# Patient Record
Sex: FEMALE
[De-identification: names, ages, dates, MRNs, and addresses within clinical notes are randomized per-mention and may not be internally consistent; named-entity substitution may affect disease eponyms.]

---

## 2024-07-03 ENCOUNTER — ASOB RESULT (OUTPATIENT)
Age: 24
End: 2024-07-03

## 2024-07-03 ENCOUNTER — APPOINTMENT (OUTPATIENT)
Dept: ANTEPARTUM | Facility: CLINIC | Age: 24
End: 2024-07-03
Payer: MEDICAID

## 2024-07-03 PROCEDURE — 76817 TRANSVAGINAL US OBSTETRIC: CPT

## 2024-07-03 PROCEDURE — 76805 OB US >/= 14 WKS SNGL FETUS: CPT

## 2024-08-02 ENCOUNTER — APPOINTMENT (OUTPATIENT)
Dept: ANTEPARTUM | Facility: CLINIC | Age: 24
End: 2024-08-02

## 2024-08-02 ENCOUNTER — ASOB RESULT (OUTPATIENT)
Age: 24
End: 2024-08-02

## 2024-08-02 PROCEDURE — 76816 OB US FOLLOW-UP PER FETUS: CPT

## 2024-10-14 PROBLEM — Z00.00 ENCOUNTER FOR PREVENTIVE HEALTH EXAMINATION: Status: ACTIVE | Noted: 2024-10-14

## 2024-12-03 ENCOUNTER — ASOB RESULT (OUTPATIENT)
Age: 24
End: 2024-12-03

## 2024-12-03 ENCOUNTER — APPOINTMENT (OUTPATIENT)
Dept: ANTEPARTUM | Facility: CLINIC | Age: 24
End: 2024-12-03
Payer: MEDICAID

## 2024-12-03 PROCEDURE — 76820 UMBILICAL ARTERY ECHO: CPT | Mod: 59

## 2024-12-03 PROCEDURE — 76816 OB US FOLLOW-UP PER FETUS: CPT

## 2024-12-03 PROCEDURE — 76819 FETAL BIOPHYS PROFIL W/O NST: CPT

## 2024-12-13 ENCOUNTER — ASOB RESULT (OUTPATIENT)
Age: 24
End: 2024-12-13

## 2024-12-13 ENCOUNTER — APPOINTMENT (OUTPATIENT)
Dept: ANTEPARTUM | Facility: CLINIC | Age: 24
End: 2024-12-13
Payer: MEDICAID

## 2024-12-13 PROCEDURE — 76815 OB US LIMITED FETUS(S): CPT

## 2024-12-13 PROCEDURE — 76820 UMBILICAL ARTERY ECHO: CPT

## 2024-12-13 PROCEDURE — 76818 FETAL BIOPHYS PROFILE W/NST: CPT

## 2024-12-14 ENCOUNTER — INPATIENT (INPATIENT)
Facility: HOSPITAL | Age: 24
LOS: 2 days | Discharge: HOME | DRG: 833 | End: 2024-12-17
Attending: SPECIALIST | Admitting: SPECIALIST
Payer: COMMERCIAL

## 2024-12-14 VITALS
SYSTOLIC BLOOD PRESSURE: 123 MMHG | RESPIRATION RATE: 19 BRPM | TEMPERATURE: 98 F | DIASTOLIC BLOOD PRESSURE: 83 MMHG | OXYGEN SATURATION: 100 % | HEART RATE: 76 BPM

## 2024-12-14 DIAGNOSIS — O26.899 OTHER SPECIFIED PREGNANCY RELATED CONDITIONS, UNSPECIFIED TRIMESTER: ICD-10-CM

## 2024-12-14 NOTE — OB RN TRIAGE NOTE - FALL HARM RISK - UNIVERSAL INTERVENTIONS
Bed in lowest position, wheels locked, appropriate side rails in place/Call bell, personal items and telephone in reach/Instruct patient to call for assistance before getting out of bed or chair/Non-slip footwear when patient is out of bed/[place] to call system/Physically safe environment - no spills, clutter or unnecessary equipment/Purposeful Proactive Rounding/Room/bathroom lighting operational, light cord in reach

## 2024-12-15 LAB
ALBUMIN SERPL ELPH-MCNC: 4.1 G/DL — SIGNIFICANT CHANGE UP (ref 3.3–5)
ALP SERPL-CCNC: 116 U/L — SIGNIFICANT CHANGE UP (ref 40–120)
ALT FLD-CCNC: 8 U/L — LOW (ref 10–45)
ANION GAP SERPL CALC-SCNC: 13 MMOL/L — SIGNIFICANT CHANGE UP (ref 5–17)
AST SERPL-CCNC: 20 U/L — SIGNIFICANT CHANGE UP (ref 10–40)
BASOPHILS # BLD AUTO: 0.02 K/UL — SIGNIFICANT CHANGE UP (ref 0–0.2)
BASOPHILS NFR BLD AUTO: 0.2 % — SIGNIFICANT CHANGE UP (ref 0–2)
BILIRUB SERPL-MCNC: 0.3 MG/DL — SIGNIFICANT CHANGE UP (ref 0.2–1.2)
BLD GP AB SCN SERPL QL: NEGATIVE — SIGNIFICANT CHANGE UP
BUN SERPL-MCNC: 6 MG/DL — LOW (ref 7–23)
CALCIUM SERPL-MCNC: 9.3 MG/DL — SIGNIFICANT CHANGE UP (ref 8.4–10.5)
CHLORIDE SERPL-SCNC: 99 MMOL/L — SIGNIFICANT CHANGE UP (ref 96–108)
CO2 SERPL-SCNC: 21 MMOL/L — LOW (ref 22–31)
CREAT ?TM UR-MCNC: 44 MG/DL — SIGNIFICANT CHANGE UP
CREAT SERPL-MCNC: 0.53 MG/DL — SIGNIFICANT CHANGE UP (ref 0.5–1.3)
EGFR: 134 ML/MIN/1.73M2 — SIGNIFICANT CHANGE UP
EOSINOPHIL # BLD AUTO: 0.05 K/UL — SIGNIFICANT CHANGE UP (ref 0–0.5)
EOSINOPHIL NFR BLD AUTO: 0.6 % — SIGNIFICANT CHANGE UP (ref 0–6)
FIBRINOGEN PPP-MCNC: 427 MG/DL — SIGNIFICANT CHANGE UP (ref 200–445)
GLUCOSE SERPL-MCNC: 95 MG/DL — SIGNIFICANT CHANGE UP (ref 70–99)
HCT VFR BLD CALC: 39.8 % — SIGNIFICANT CHANGE UP (ref 34.5–45)
HGB BLD-MCNC: 12.7 G/DL — SIGNIFICANT CHANGE UP (ref 11.5–15.5)
IMM GRANULOCYTES NFR BLD AUTO: 0.4 % — SIGNIFICANT CHANGE UP (ref 0–0.9)
LDH SERPL L TO P-CCNC: 201 U/L — SIGNIFICANT CHANGE UP (ref 50–242)
LYMPHOCYTES # BLD AUTO: 2.23 K/UL — SIGNIFICANT CHANGE UP (ref 1–3.3)
LYMPHOCYTES # BLD AUTO: 25 % — SIGNIFICANT CHANGE UP (ref 13–44)
MCHC RBC-ENTMCNC: 28.3 PG — SIGNIFICANT CHANGE UP (ref 27–34)
MCHC RBC-ENTMCNC: 31.9 G/DL — LOW (ref 32–36)
MCV RBC AUTO: 88.6 FL — SIGNIFICANT CHANGE UP (ref 80–100)
MONOCYTES # BLD AUTO: 0.55 K/UL — SIGNIFICANT CHANGE UP (ref 0–0.9)
MONOCYTES NFR BLD AUTO: 6.2 % — SIGNIFICANT CHANGE UP (ref 2–14)
NEUTROPHILS # BLD AUTO: 6.04 K/UL — SIGNIFICANT CHANGE UP (ref 1.8–7.4)
NEUTROPHILS NFR BLD AUTO: 67.6 % — SIGNIFICANT CHANGE UP (ref 43–77)
NRBC # BLD: 0 /100 WBCS — SIGNIFICANT CHANGE UP (ref 0–0)
PLATELET # BLD AUTO: 190 K/UL — SIGNIFICANT CHANGE UP (ref 150–400)
POTASSIUM SERPL-MCNC: 3.9 MMOL/L — SIGNIFICANT CHANGE UP (ref 3.5–5.3)
POTASSIUM SERPL-SCNC: 3.9 MMOL/L — SIGNIFICANT CHANGE UP (ref 3.5–5.3)
PROT ?TM UR-MCNC: 6 MG/DL — SIGNIFICANT CHANGE UP (ref 0–12)
PROT SERPL-MCNC: 7.5 G/DL — SIGNIFICANT CHANGE UP (ref 6–8.3)
PROT/CREAT UR-RTO: 0.1 RATIO — SIGNIFICANT CHANGE UP (ref 0–0.2)
RBC # BLD: 4.49 M/UL — SIGNIFICANT CHANGE UP (ref 3.8–5.2)
RBC # FLD: 15.2 % — HIGH (ref 10.3–14.5)
RH IG SCN BLD-IMP: POSITIVE — SIGNIFICANT CHANGE UP
RH IG SCN BLD-IMP: POSITIVE — SIGNIFICANT CHANGE UP
SODIUM SERPL-SCNC: 133 MMOL/L — LOW (ref 135–145)
URATE SERPL-MCNC: 4.5 MG/DL — SIGNIFICANT CHANGE UP (ref 2.5–7)
WBC # BLD: 8.93 K/UL — SIGNIFICANT CHANGE UP (ref 3.8–10.5)
WBC # FLD AUTO: 8.93 K/UL — SIGNIFICANT CHANGE UP (ref 3.8–10.5)

## 2024-12-15 RX ORDER — SODIUM CHLORIDE 9 MG/ML
3 INJECTION, SOLUTION INTRAMUSCULAR; INTRAVENOUS; SUBCUTANEOUS EVERY 8 HOURS
Refills: 0 | Status: DISCONTINUED | OUTPATIENT
Start: 2024-12-15 | End: 2024-12-17

## 2024-12-15 RX ORDER — AMPICILLIN TRIHYDRATE 250 MG/5ML
1 SUSPENSION, RECONSTITUTED, ORAL (ML) ORAL EVERY 4 HOURS
Refills: 0 | Status: DISCONTINUED | OUTPATIENT
Start: 2024-12-15 | End: 2024-12-16

## 2024-12-15 RX ORDER — ACETAMINOPHEN 500MG 500 MG/1
975 TABLET, COATED ORAL
Refills: 0 | Status: DISCONTINUED | OUTPATIENT
Start: 2024-12-15 | End: 2024-12-17

## 2024-12-15 RX ORDER — TETANUS TOXOID, REDUCED DIPHTHERIA TOXOID AND ACELLULAR PERTUSSIS VACCINE, ADSORBED 5; 2.5; 8; 8; 2.5 [IU]/.5ML; [IU]/.5ML; UG/.5ML; UG/.5ML; UG/.5ML
0.5 SUSPENSION INTRAMUSCULAR ONCE
Refills: 0 | Status: DISCONTINUED | OUTPATIENT
Start: 2024-12-15 | End: 2024-12-17

## 2024-12-15 RX ORDER — DEXAMETHASONE 1.5 MG/1
4 TABLET ORAL EVERY 6 HOURS
Refills: 0 | Status: DISCONTINUED | OUTPATIENT
Start: 2024-12-15 | End: 2024-12-17

## 2024-12-15 RX ORDER — ONDANSETRON HYDROCHLORIDE 4 MG/1
4 TABLET, FILM COATED ORAL EVERY 6 HOURS
Refills: 0 | Status: DISCONTINUED | OUTPATIENT
Start: 2024-12-15 | End: 2024-12-17

## 2024-12-15 RX ORDER — DIPHENHYDRAMINE HCL 25 MG
25 CAPSULE ORAL EVERY 6 HOURS
Refills: 0 | Status: DISCONTINUED | OUTPATIENT
Start: 2024-12-15 | End: 2024-12-17

## 2024-12-15 RX ORDER — INFLUENZA VIRUS VACCINE 15; 15; 15; 15 UG/.5ML; UG/.5ML; UG/.5ML; UG/.5ML
0.5 SUSPENSION INTRAMUSCULAR ONCE
Refills: 0 | Status: COMPLETED | OUTPATIENT
Start: 2024-12-15 | End: 2024-12-15

## 2024-12-15 RX ORDER — 0.9 % SODIUM CHLORIDE 0.9 %
500 INTRAVENOUS SOLUTION INTRAVENOUS ONCE
Refills: 0 | Status: COMPLETED | OUTPATIENT
Start: 2024-12-15 | End: 2024-12-15

## 2024-12-15 RX ORDER — KETOROLAC TROMETHAMINE 30 MG/ML
30 INJECTION INTRAMUSCULAR; INTRAVENOUS ONCE
Refills: 0 | Status: DISCONTINUED | OUTPATIENT
Start: 2024-12-15 | End: 2024-12-15

## 2024-12-15 RX ORDER — WITCH HAZEL 50 G/100ML
1 SOLUTION RECTAL EVERY 4 HOURS
Refills: 0 | Status: DISCONTINUED | OUTPATIENT
Start: 2024-12-15 | End: 2024-12-17

## 2024-12-15 RX ORDER — FENTANYL/BUPIVACAINE/NS/PF 2-625MCG/1
250 PLASTIC BAG, INJECTION (ML) INJECTION
Refills: 0 | Status: DISCONTINUED | OUTPATIENT
Start: 2024-12-15 | End: 2024-12-15

## 2024-12-15 RX ORDER — .BETA.-CAROTENE, SODIUM ACETATE, ASCORBIC ACID, CHOLECALCIFEROL, .ALPHA.-TOCOPHEROL ACETATE, DL-, THIAMINE MONONITRATE, RIBOFLAVIN, NIACINAMIDE, PYRIDOXINE HYDROCHLORIDE, FOLIC ACID, CYANOCOBALAMIN, CALCIUM CARBONATE, FERROUS FUMARATE, ZINC OXIDE AND CUPRIC OXIDE 2000; 2000; 120; 400; 22; 1.84; 3; 20; 10; 1; 12; 200; 27; 25; 2 [IU]/1; [IU]/1; MG/1; [IU]/1; MG/1; MG/1; MG/1; MG/1; MG/1; MG/1; UG/1; MG/1; MG/1; MG/1; MG/1
1 TABLET ORAL DAILY
Refills: 0 | Status: DISCONTINUED | OUTPATIENT
Start: 2024-12-15 | End: 2024-12-17

## 2024-12-15 RX ORDER — OXYCODONE HYDROCHLORIDE 30 MG/1
5 TABLET ORAL
Refills: 0 | Status: DISCONTINUED | OUTPATIENT
Start: 2024-12-15 | End: 2024-12-17

## 2024-12-15 RX ORDER — IBUPROFEN 200 MG
600 TABLET ORAL EVERY 6 HOURS
Refills: 0 | Status: COMPLETED | OUTPATIENT
Start: 2024-12-15 | End: 2025-11-13

## 2024-12-15 RX ORDER — 0.9 % SODIUM CHLORIDE 0.9 %
1000 INTRAVENOUS SOLUTION INTRAVENOUS
Refills: 0 | Status: DISCONTINUED | OUTPATIENT
Start: 2024-12-15 | End: 2024-12-15

## 2024-12-15 RX ORDER — AMPICILLIN TRIHYDRATE 250 MG/5ML
SUSPENSION, RECONSTITUTED, ORAL (ML) ORAL
Refills: 0 | Status: DISCONTINUED | OUTPATIENT
Start: 2024-12-15 | End: 2024-12-16

## 2024-12-15 RX ORDER — HYDROCORTISONE BUTYRATE 0.1 %
1 CREAM (GRAM) TOPICAL EVERY 6 HOURS
Refills: 0 | Status: DISCONTINUED | OUTPATIENT
Start: 2024-12-15 | End: 2024-12-17

## 2024-12-15 RX ORDER — CHLORHEXIDINE GLUCONATE 1.2 MG/ML
1 RINSE ORAL DAILY
Refills: 0 | Status: DISCONTINUED | OUTPATIENT
Start: 2024-12-15 | End: 2024-12-15

## 2024-12-15 RX ORDER — DIBUCAINE 1 %
1 OINTMENT (GRAM) TOPICAL EVERY 6 HOURS
Refills: 0 | Status: DISCONTINUED | OUTPATIENT
Start: 2024-12-15 | End: 2024-12-17

## 2024-12-15 RX ORDER — PRAMOXINE HYDROCHLORIDE 1 MG/ML
1 LOTION TOPICAL EVERY 4 HOURS
Refills: 0 | Status: DISCONTINUED | OUTPATIENT
Start: 2024-12-15 | End: 2024-12-17

## 2024-12-15 RX ORDER — SIMETHICONE 125 MG
80 CAPSULE ORAL EVERY 4 HOURS
Refills: 0 | Status: DISCONTINUED | OUTPATIENT
Start: 2024-12-15 | End: 2024-12-17

## 2024-12-15 RX ORDER — TRISODIUM CITRATE DIHYDRATE AND CITRIC ACID MONOHYDRATE 500; 334 MG/5ML; MG/5ML
15 SOLUTION ORAL EVERY 6 HOURS
Refills: 0 | Status: DISCONTINUED | OUTPATIENT
Start: 2024-12-15 | End: 2024-12-15

## 2024-12-15 RX ORDER — BENZOCAINE 10 %
1 OINTMENT (GRAM) TOPICAL EVERY 6 HOURS
Refills: 0 | Status: DISCONTINUED | OUTPATIENT
Start: 2024-12-15 | End: 2024-12-17

## 2024-12-15 RX ORDER — LANOLIN 72 %
1 OINTMENT (GRAM) TOPICAL EVERY 6 HOURS
Refills: 0 | Status: DISCONTINUED | OUTPATIENT
Start: 2024-12-15 | End: 2024-12-17

## 2024-12-15 RX ORDER — AMPICILLIN TRIHYDRATE 250 MG/5ML
2 SUSPENSION, RECONSTITUTED, ORAL (ML) ORAL ONCE
Refills: 0 | Status: COMPLETED | OUTPATIENT
Start: 2024-12-15 | End: 2024-12-15

## 2024-12-15 RX ORDER — OXYCODONE HYDROCHLORIDE 30 MG/1
5 TABLET ORAL ONCE
Refills: 0 | Status: DISCONTINUED | OUTPATIENT
Start: 2024-12-15 | End: 2024-12-17

## 2024-12-15 RX ORDER — NALOXONE HCL 0.4 MG/ML
0.1 AMPUL (ML) INJECTION
Refills: 0 | Status: DISCONTINUED | OUTPATIENT
Start: 2024-12-15 | End: 2024-12-17

## 2024-12-15 RX ADMIN — ONDANSETRON HYDROCHLORIDE 4 MILLIGRAM(S): 4 TABLET, FILM COATED ORAL at 16:35

## 2024-12-15 RX ADMIN — Medication 208 GRAM(S): at 06:21

## 2024-12-15 RX ADMIN — ONDANSETRON HYDROCHLORIDE 4 MILLIGRAM(S): 4 TABLET, FILM COATED ORAL at 16:20

## 2024-12-15 RX ADMIN — Medication 167 MILLIUNIT(S)/MIN: at 20:30

## 2024-12-15 RX ADMIN — Medication 208 GRAM(S): at 18:34

## 2024-12-15 RX ADMIN — Medication 1000 MILLILITER(S): at 11:15

## 2024-12-15 RX ADMIN — Medication 2 MILLIUNIT(S)/MIN: at 08:24

## 2024-12-15 RX ADMIN — Medication 208 GRAM(S): at 14:24

## 2024-12-15 RX ADMIN — KETOROLAC TROMETHAMINE 30 MILLIGRAM(S): 30 INJECTION INTRAMUSCULAR; INTRAVENOUS at 22:40

## 2024-12-15 RX ADMIN — Medication 216 GRAM(S): at 02:15

## 2024-12-15 RX ADMIN — Medication 125 MILLILITER(S): at 01:11

## 2024-12-15 RX ADMIN — Medication 208 GRAM(S): at 10:22

## 2024-12-15 NOTE — OB RN DELIVERY SUMMARY - NS_SEPSISRSKCALC_OBGYN_ALL_OB_FT
EOS calculated successfully. EOS Risk Factor: 0.5/1000 live births ([facility] national incidence); GA=40w3d; Temp=98.6; ROM=7.9; GBS='Positive'; Antibiotics='Broad spectrum antibiotics > 4 hrs prior to birth'

## 2024-12-15 NOTE — OB RN PATIENT PROFILE - NS_BABIESUTERO_OBGYN_ALL_OB_NU
"Dennys Bee is a 54 year old man who is being evaluated via a billable telephone visit.      The patient has been notified of following:     \"This telephone visit will be conducted via a call between you and your physician/provider. We have found that certain health care needs can be provided without the need for a physical exam.  This service lets us provide the care you need with a short phone conversation.  If a prescription is necessary we can send it directly to your pharmacy.  If lab work is needed we can place an order for that and you can then stop by our lab to have the test done at a later time.    Telephone visits are billed at different rates depending on your insurance coverage. During this emergency period, for some insurers they may be billed the same as an in-person visit.  Please reach out to your insurance provider with any questions.    If during the course of the call the physician/provider feels a telephone visit is not appropriate, you will not be charged for this service.\"    Patient has given verbal consent for Telephone visit?  Yes    What phone number would you like to be contacted at? [phone]    How would you like to obtain your AVS? Darío    Phone call duration: 24 minutes (5:52 - 6:16)    Division of Infectious Diseases and International Medicine  Department of Medicine        [facility] OUTPATIENT TELEPHONE VISIT NOTE [address] [address]  [address] [place] [address]  Office: [phone]  Fax:  [phone]     HISTORY OF PRESENT ILLNESS:    Mr. Bee is a pleasant 54 year old gentleman with asymptomatic HIV infection who was scheduled for an in-person semi-annual follow-up visit in [facility] but instead because of Covid-19 is residing at his parents' home in [place] at present and so instead of traveling to the [facility] opted for a Virtual Telephone visit today.  He is appreciative of the option.  He remains " on continued antiretroviral therapy with Combivir one tablet PO BID plus nevirapine 200 mg PO BID (since 5/03), reports that he has not missed a dose in a long time, and experiences no side effects or difficulties.  Because he tolerates it well, he has been to switch to an alternative once daily regimen, but we again discussed the pros and cons of that today and he is more interested in making a switch now.  Despite Covid-19 and a history of depression, as well as that his father had a long hospitalization this winter with a diagnosis of cancer (although is doing fairly well now), he reports that his mood has been stable and he is coping adequately.  He remains on chronic Serzone 100 mg TID) and follows with his longterm psychiatrist, Dr. Johnston (at the [facility] in [place]).  He also remains on a decreased dose of fenofibrate 108 mg daily (plus ongoing pravastatin 10 mg PO daily) as well as amlodipine 10 mg daily (increased 5/17/17, started 5/20/15) plus enalapril (Vasotec) 20 mg PO BID (started 11/29/11 and dose increased 1/27/15).  His blood pressures when checked infrequently remain good, he says.  He is not performing aerobic exercise very often.   Additionally, he remains on Synthroid 200 mcg / day (since 3/14) for chronic hypothyroidism and on topical testosterone, Axiron, one 30 mg pump daily (often only taking it every other day) for hypotestosteronism / erectile dysfunction, without side effects.  We have renewed all his non-psychiatric medications today (with an antiretroviral switch).  He reports feeling overall healthy of late without recent acute complaints including no fever, chills, night sweats, anorexia, unusual fatigue, EENT symptoms, cough, dyspnea, chest pain, nausea, abdominal pain, diarrhea, rash, myalgias / arthralgias (despite the statin), genitourinary symptoms (beyond chronic stable nocturnal frequency and ED), headache, or other neurological symptoms.    PAST MEDICAL  HISTORY:    1. HIV, diagnosed 7/93, previously treated with AZT, 3TC and Crixivan. He was then on AZT plus efavirenz, but in 5/03 began Combivir/Sustiva and has remained virologically suppressed on this regimen since.  2. Stable chronic stage 1 kidney disease:  Nephrology Clinic evaluation (including negative MRI scan) unremarkable in 2009.  3. Hyperlipidemia previously requiring multiple medications, more hypertriglyceridemia than hypercholesterolemia -- now on fenofibrate plus Pravachol which was added 4/18/18.   4. Hyperthyroidism on levothyroxine.   5. History of difficult-to-treat anxiety and depression -- followed for a number of years by psychiatrist Dr. Abebe Johnston in [place].   6. Hypertension treated with Vasotec.   7. Acute appendicitis with rupture in the summer of 2007.  8. Cholecystectomy in the summer of 2007.   9. History of abnormal anal Pap smear with AIN-2 and AIN-3, most recently evaluated by colorectal surgery in February of 2009 with a normal biopsy.  Followed there annually.   10. 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  Previously received Sculptra.   11. History of a low testosterone level, most recently checked in 1/09 with a normal free testosterone of 14.0 and a total testosterone in the normal range of 482, managed somewhat unorthodoxly by a now-retired Urologist until 7/13.  Since then, has continued taking biweekly home exogenous testosterone IM injections and has tapered them only marginally from ~ weekly to ~ every other week (or a bit less frequent).  Total testosterone level on 5/14/14 was 2926.  12. Syphilis diagnosed 5/08, peak titer of 1:256, completed a treatment course of IM penicillin times 3, last dose in 6/08.  13. Prior rectal warts, treated with Aldara cream.  14. Infected lower left cracked mandibular molar tooth, repaired 6/10.  15. Unintentional overdose of alcohol, hydrocodone, and  clonazepam for which hospitalized in [place] in 10/12.  16. Mild intermittent controlled extensor limb psoriasis.  17. Sleep apnea, diagnosed elsewhere.    Past Surgical History:   Procedure Laterality Date     APPENDECTOMY OPEN  Summer 2007.    For acute appendicitis with rupture.     CHOLECYSTECTOMY  2007.     ALLERGIES:  PCN and iodine    CURRENT MEDICATIONS:  Current Outpatient Medications   Medication Sig Dispense Refill     amLODIPine (NORVASC) 10 MG tablet Take 1 tablet (10 mg) by mouth daily 90 tablet 3     aspirin 81 MG tablet Take 1 tablet by mouth daily.       bictegravir-emtricitabine-tenofovir (BIKTARVY) -25 MG per tablet Take 1 tablet by mouth daily 90 tablet 3     buPROPion (WELLBUTRIN SR) 150 MG 12 hr tablet Take 1 tablet (150 mg) by mouth 3 times daily 90 tablet 0     enalapril (VASOTEC) 20 MG tablet Take 1 tablet (20 mg) by mouth 2 times daily 180 tablet 3     fenofibrate (LOFIBRA) 54 MG tablet TAKE TWO TABLETS BY MOUTH ONCE DAILY 180 tablet 3     lamoTRIgine (LAMICTAL) 200 MG tablet        levothyroxine (SYNTHROID/LEVOTHROID) 100 MCG tablet TAKE TWO TABLETS BY MOUTH EVERY  tablet 3     multivitamin, therapeutic with minerals (MULTI-VITAMIN) TABS Take 1 tablet by mouth daily       nefazodone (SERZONE) 100 MG tablet 100 mg 4 times daily        pravastatin (PRAVACHOL) 10 MG tablet Take 1 tablet (10 mg) by mouth daily 90 tablet 3     testosterone (AXIRON) 30 MG/ACT topical solution Place 1 pump (30 mg) onto the skin daily 90 mL 5     FAMILY HISTORY:  Family History   Problem Relation Age of Onset     Depression Father      Hyperlipidemia Father      Depression Other         Multiple family members.     Heart Disease Maternal Grandmother      Myocardial Infarction Maternal Grandfather         Sudden MI / death at age 51.     Hyperlipidemia Maternal Grandfather      Obesity Paternal Grandmother      Hyperlipidemia Other         Multiple family members (Parents, brother, sister, PGM)      SOCIAL HISTORY:  Lives alone in basement apartment in [place]ut spends much of his time at his parents home in [place] in the town [place] in and where all his siblings still live.  Unemployed, on disability based on his depression diagnosis.  Sexually active, no single steady partner, mostly with other HIV seropositive men, but always discloses his status when with HIV seronegative partners and uses protection.  In the past, has visited friends in [place] for an extended stay.  Tobacco:  Long-standing, up to 2.5 ppd in the past.  Presently 1 ppd (but smokes only half of each cigarette).  Has quit for up to three weeks several times in the past; not interested in further attempts at reduction at present.  Has a treadmill in the basement of his parents home that he can use for indoor exercise during cold weather, although he does so infrequently.    REVIEW OF SYSTEMS:  As per HPI.  Complete ROS is otherwise negative.    PHYSICAL EXAMINATION:  Reported vitals:  Respirations sound normal.  There were no other vitals taken for this telephone visit.   Most aspects of the Physical Exam were unobtainable in the context of this telephone visit, but the following was ascertained:  GENERAL:  Pleasant, conversant, calm, comfortable-sounding.  ENT:  No evident hearing deficit.  Normal tone of voice and volume.  RESP: No cough, no audible wheezing.  Able to talk in full sentences.  NEURO:  Alert, oriented x 3, memory / cognition sound normal.  PSYCH: Normal affect, coherent speech, able to articulate logical thoughts and reason, no tangential thoughts, no hallucinations.    LABORATORY STUDIES (Reviewed with the patient during his appointment today):      Cholesterol 06/12/2020 184  <200 mg/dL Final     Triglycerides 06/12/2020 216* <150 mg/dL Final    Comment: Borderline high:  150-199 mg/dl  High:             200-499 mg/dl  Very high:       >499 mg/dl       HDL Cholesterol 06/12/2020 51  >39 mg/dL  Final     LDL Cholesterol Calculated 06/12/2020 90  <100 mg/dL Final    Desirable:       <100 mg/dl     Non HDL Cholesterol 06/12/2020 133* <130 mg/dL Final    Comment: Above Desirable:  130-159 mg/dl  Borderline high:  160-189 mg/dl  High:             190-219 mg/dl  Very high:       >219 mg/dl       Treponema Antibodies 06/12/2020 Reactive* NR^Nonreactive Final    Comment: The Anti-Treponema Antibody screening tends to remain positive for life,   therefore it does not distinguish between active and past syphilis infections.   All positive and equivocal results will automatically reflex to a   non-specific Rapid Plasma Reagin (RPR) test.  If the Treponema Antibody is positive, and the RPR is positive, this is   presumptive evidence of current infection, inadequately treated infection,   persistent infection or reinfection.  If the Anti-Treponema Antibody is positive and the RPR is negative, then a   second Treponema specific test (TP-PA) will be performed to determine whether   the antibody test is falsely positive or is detecting early infection.  If the   latter is suspected, repeat testing in approximately two weeks is   recommended.  Methodology Change: Test performed on the [employer] XL by Treponema   pallidum Total Antibodies Assay as of 3.17.2020.       CK Total 06/12/2020 210  30 - 300 U/L Final     Lipase 06/12/2020 91  73 - 393 U/L Final     Testosterone Total 06/12/2020 409  240 - 950 ng/dL Final    Comment: This test was developed and its performance characteristics determined by the   [facility].   It has not been cleared or approved by the FDA. The laboratory is regulated   under CLIA as qualified to perform high-complexity testing. This test is used   for clinical purposes. It should not be regarded as investigational or for   research.       IFC Specimen 06/12/2020 Blood   Final     CD3 Mature T 06/12/2020 89* 49 - 84 % Final      CD4 [place] T 06/12/2020 36  28 - 63 % Final     CD8 Suppressor T 06/12/2020 58* 10 - 40 % Final     CD4:CD8 Ratio 06/12/2020 0.62* 1.40 - 2.60 Final     Absolute CD3 06/12/2020 2,911  603 - 2,990 cells/uL Final     Absolute CD4 06/12/2020 1,169  441 - 2,156 cells/uL Final     Absolute CD8 06/12/2020 1,884* 125 - 1,312 cells/uL Final     HIV-1 RNA Quant Result 06/12/2020 HIV-1 RNA Not Detected  HIVND^HIV-1 RNA Not Detected [Copies]/mL Final    Comment: The BEV AmpliPrep/BEV TaqMan HIV-1 test is an FDA-approved in vitro   nucleic acid amplification test for the quantitation of HIV-1 RNA in human   plasma (EDTA plasma) using the BEV AmpliPrep instrument for automated viral   nucleic acid extraction and the BEV TaqMan Analyzer or [employer] TaqMan for   automated Real Time PCR amplification and detection of the viral nucleic acid   target.  Titer results are reported in copies/ml. This assay is intended for use in   conjunction with clinical presentation and other laboratory markers of disease   prognosis and for use as an aid in assessing viral response to antiretroviral   treatment as measured by changes in plasma HIV-1 RNA levels. This test should   not be used as a donor screening test to confirm the presence of HIV-1   infection.       HIV RNA QT Log 06/12/2020 Not Calculated  <1.3 [Log_copies]/mL Final     WBC 06/12/2020 7.1  4.0 - 11.0 10e9/L Final     RBC Count 06/12/2020 3.57* 4.4 - 5.9 10e12/L Final     Hemoglobin 06/12/2020 13.3  13.3 - 17.7 g/dL Final     Hematocrit 06/12/2020 37.4* 40.0 - 53.0 % Final     MCV 06/12/2020 105* 78 - 100 fl Final     MCH 06/12/2020 37.3* 26.5 - 33.0 pg Final     MCHC 06/12/2020 35.6  31.5 - 36.5 g/dL Final     RDW 06/12/2020 12.9  10.0 - 15.0 % Final     Platelet Count 06/12/2020 322  150 - 450 10e9/L Final     Diff Method 06/12/2020 Automated Method   Final     % Neutrophils 06/12/2020 50.4  % Final     % Lymphocytes 06/12/2020 40.4  % Final     % Monocytes 06/12/2020  5.6  % Final     % Eosinophils 06/12/2020 2.5  % Final     % Basophils 06/12/2020 0.8  % Final     % Immature Granulocytes 06/12/2020 0.3  % Final     Nucleated RBCs 06/12/2020 0  0 /100 Final     Absolute Neutrophil 06/12/2020 3.6  1.6 - 8.3 10e9/L Final     Absolute Lymphocytes 06/12/2020 2.9  0.8 - 5.3 10e9/L Final     Absolute Monocytes 06/12/2020 0.4  0.0 - 1.3 10e9/L Final     Absolute Eosinophils 06/12/2020 0.2  0.0 - 0.7 10e9/L Final     Absolute Basophils 06/12/2020 0.1  0.0 - 0.2 10e9/L Final     Abs Immature Granulocytes 06/12/2020 0.0  0 - 0.4 10e9/L Final     Absolute Nucleated RBC 06/12/2020 0.0   Final     Sodium 06/12/2020 139  133 - 144 mmol/L Final     Potassium 06/12/2020 3.8  3.4 - 5.3 mmol/L Final     Chloride 06/12/2020 108  94 - 109 mmol/L Final     Carbon Dioxide 06/12/2020 25  20 - 32 mmol/L Final     Anion Gap 06/12/2020 6  3 - 14 mmol/L Final     Glucose 06/12/2020 98  70 - 99 mg/dL Final     Urea Nitrogen 06/12/2020 13  7 - 30 mg/dL Final     Creatinine 06/12/2020 1.00  0.66 - 1.25 mg/dL Final     GFR Estimate 06/12/2020 86  >60 mL/min/[1.73_m2] Final    Comment: Non  GFR Calc  Starting 12/18/2018, serum creatinine based estimated GFR (eGFR) will be   calculated using the Chronic Kidney Disease Epidemiology Collaboration   (CKD-EPI) equation.       GFR Estimate If Black 06/12/2020 >90  >60 mL/min/[1.73_m2] Final    Comment:  GFR Calc  Starting 12/18/2018, serum creatinine based estimated GFR (eGFR) will be   calculated using the Chronic Kidney Disease Epidemiology Collaboration   (CKD-EPI) equation.       Calcium 06/12/2020 9.1  8.5 - 10.1 mg/dL Final     Bilirubin Total 06/12/2020 0.4  0.2 - 1.3 mg/dL Final     Albumin 06/12/2020 4.2  3.4 - 5.0 g/dL Final     Protein Total 06/12/2020 7.4  6.8 - 8.8 g/dL Final     Alkaline Phosphatase 06/12/2020 54  40 - 150 U/L Final     ALT 06/12/2020 26  0 - 70 U/L Final     AST 06/12/2020 13  0 - 45 U/L Final     Rapid  Plasma Reagin 06/12/2020 Nonreactive  NR^Nonreactive Final    Comment: This test should not be used as a primary diagnostic approach for syphilis,   and a serum specimen should be submitted for a treponemal-specific antibody   test.  Biological false-positive reactions with cardiolipin-type antigens have been   reported in disease such as infectious mononucleosis, leprosy, malaria, lupus   erythematosus, vaccinia, and viral pneumonia.  Pregnancy, autoimmune diseases,   and narcotic additions may give false-positives. Pinta, yaws, bejel, and   other treponemal diseases may also produce false-positive results with this   test.  Specimen sent to [facility]) for   confirmation.       T Pallidum by FLORES conf 06/12/2020 Reactive* Non Reactive Final    Comment: (Note)  Performed by [employer],  [address] [place] [id number] [phone]  [email or web address], Terrence Naqvi MD, Lab. Director       ASSESSMENT/PLAN:    1. Stable HIV infection:  He has been on antiretroviral therapy of Combivir / nevirapine long-term with tight dosing adherence, a normal absolute CD4+ cell count and an undetectable HIV plasma viral load, but wonders if switching off zidovudine would be safer for his heart and lipids going forward, and says he would appreciate being on a one-pill-once-daily regimen.  He is interested in trying Biktarvy instead.  We discussed the pros and cons of this switch, including dosing schedule and possible side effects, as well as the option (since he remains in good virological control) of switching back to nevirapine / Combivir in the future if for some reason he dislikes Biktarvy.  He will switch to Biktarvy one tablet daily with his next prescription refill.  Assuming that goes well, he will return to clinic for follow-up in six months (with pre-labs before that visit, ordered today), or as needed before then.  2. Controlled essential hypertension:  On ongoing amlodipine 10  mg PO daily (increased 5/17/17, started 5/20/15) and enalapril 20 mg PO BID (started 11/29/11 and dose increased 1/27/15) his blood pressure has been well-controlled in the past, both by office and (per his indirect report) home measurements.  I asked him to try to obtain three BP readings within the next month or so and send those [facility].  As usual, I again encouraged frequent aerobic exercise.  3. Controlled hyperlipidemia:  With additional of pravastatin 10 mg PO daily in early 2018 (which he tolerates well) and perhaps due to other factors (different psychiatric medications?, decreased testosterone dose), his most recent triglycerides on 12/13/19 were improved at 96 even though his fenofibrate dose was reduced from 162 mg daily to 108 mg daily at his most recent past 6/18/19 clinic appointment.  His total cholesterol remained improved at 184 on 6/12/20 (with an LDL of 90 and HDL of 51).  He will stay on the present fenofibrate dose of 108 mg PO daily for now, but consider decreasing it further to only 54 mg (one tablet) daily a month or two before his next blood draw, if he wishes, with a recheck of his lipids profile at that draw.  4. Testosterone replacement therapy / previous erectile dysfunction:  His most recent total testosterone level on 6/12/20 was normal and appropriate at 409 on Axiron  transcutaneous testosterone topical replacement therapy, initially at two (30 mg) pumps (= 60 mg) daily (since late 1/15), but now in the past year at one pump daily since mid-2019.  5. Appropriately-treated hypothyroidism:  He feels well and his TSH was again normal on 12/13/19 at 1.79, so he continues taking the same levothyroxine dose of 200 mcg / day (which was lowered in 3/14).  6. BPH / nocturnal urinary frequency:  He had palpable prostatic enlargement on 10/18/17 digital rectal exam with a normal PSA screen.  His nocturia is unchanged.  He is not been interested in trying Flomax.  7. Stable, unchanged,  old cheeks lipoatrophy:  He previously received Sculptra injections to the cheeks and is considering undergoing such injections again.  This is another reason he has decided to switch off zidovudine, although there has not been any evident progression of his lipodystrophy for a number of years.  8. Chronic, intractable depression / anxiety / insomnia / agoraphobia:  He follows with Dr. Johnston, his psychiatrist in the [facility] in [place], who he sees every two months.  At present, he seems to be tolerating winter reasonably well on nefazodone (Serzone) 100 mg PO TID, which he seems to tolerate well.  We previously determined there are no major Serzone drug-drug interaction issues with his current antiretroviral drugs nor with Biktarvy (although the bictegravir AUC may be somewhat mildly increased) and no major concerns with his other medications.  9. Chronic stage 2 renal insufficiency, now resolved:  He has a history of mild chronic kidney insufficiency with past creatinines previously in the 1.2 - 1.5 range since 2005, but now with the three most recent creatinines stable in the 1.00 - 1.07 range in 2019 - 20.  10. History of prior mild mid-winter vitamin D deficiency:  He self-takes OTC vitamin D supplements most white, although whether he needs that is questionable, because his 10/12/18 vitamin D level was adequate at 20.  11. Mild psoriasis, presently in remission:  Well-controlled with topical Clobex prn.  Not discussed today.  12. Health Care Maintenance:  Influenza vaccine given 12/18/19.  Menactra vaccine given 11/18/15.  Tdap was boosted 1/21/15.  Prevnar 13 given 10/16/13; Pneumovax given 1/21/15.  HAV / HBV immunized / seroimmune.  HCV negative 6/1/09.  Negative rectal Pap done by Dr. Sanchez 6/28/10.  Antitreponemal antibody positive since 8/27/09 but repeat RPR was again negative on 6/12/20.  Quantiferon Gold was negative 11/12/15.  A screening hemoglobin A1C (because he has had  several slightly elevated random glucoses over the past several years) was normal at 5.6% on 6/14/19.  Sees a dentist annually.  Sees a sleep apnea specialist for Cpap.  Saw an ophthalmologist in early 2012.  Had a  non-[facility] dermatologist appointment for a complete skin check in early 2019 and was told he had only chronic lentiga without any concerning lesions.  Underwent a routine screening colonoscopy at [facility] in [place], [place], in 1/19 which was normal (without polyps, per the patient) except for diverticulosis  -- encourage to intake more fiber.       1

## 2024-12-15 NOTE — OB PROVIDER H&P - NSLOWPPHRISK_OBGYN_A_OB
No previous uterine incision/Eng Pregnancy/Less than or equal to 4 previous vaginal births/No known bleeding disorder/No history of postpartum hemorrhage/No other PPH risks indicated

## 2024-12-15 NOTE — OB RN DELIVERY SUMMARY - NSSELHIDDEN_OBGYN_ALL_OB_FT
[NS_DeliveryAttending1_OBGYN_ALL_OB_FT:[id number]],[NS_DeliveryAssist1_OBGYN_ALL_OB_FT:[id number]],[NS_DeliveryRN_OBGYN_ALL_OB_FT:[id number]],[NS_CirculateRN2_OBGYN_ALL_OB_FT:[id number]]

## 2024-12-15 NOTE — OB PROVIDER DELIVERY SUMMARY - NSSELHIDDEN_OBGYN_ALL_OB_FT
[NS_DeliveryAttending1_OBGYN_ALL_OB_FT:[id number]],[NS_DeliveryAssist1_OBGYN_ALL_OB_FT:[id number]],[NS_DeliveryRN_OBGYN_ALL_OB_FT:[id number]],[NS_CirculateRN2_OBGYN_ALL_OB_FT:[id number]]

## 2024-12-15 NOTE — PROVIDER CONTACT NOTE (FALL NOTIFICATION) - SITUATION
Getting up to wheelchair for transfer to postpartum, pt loss conscious and fell to ground, emergency alarmed pulled and nursing staff and providers immediately to came to bedside

## 2024-12-15 NOTE — OB RN DELIVERY SUMMARY - NS_RESUSCITPROC_OBGYN_ALL_OB
Patient will be referred to the appropriate level of outpatient treatment and have appointments within 3-5 days of discharge.  No substance abuse issues  Benefits in place   Safe home to return to at time of dc Patient will be referred to the appropriate level of outpatient treatment and have appointments within 3-5 days of discharge.  No substance abuse issues  Benefits in place   Safe home to return to at time of dc Patient will be referred to the appropriate level of outpatient treatment and have appointments within 3-5 days of discharge.  No substance abuse issues  Benefits in place   Safe home to return to at time of dc Nasal Suction/Blow by O2/CPAP/T-Piece Resuscitator/Pulse Oximetry/FIO2

## 2024-12-15 NOTE — CHART NOTE - NSCHARTNOTEFT_GEN_A_CORE
This is a 24y now  s/p uncomplicated . Patient was 3 hours post partum uncomplicated  who vasovagaled upon getting up to wheelchair for transfer to postpartum.  Pt loss consciousness and fell to ground with nursing supporting her body weight. The emergency alarm was pulled. Upon entry to [facility], nursing staff was immediately at bedside with a 1000cc bolus open and running.  At this time, patient was conscious and patient was sitting up in bed with knees to her chest. Patient denies hitting her head. She endorses right ear pain, nausea, and fatigue.     PE:    Vital Signs Last 24 Hrs  T(C): 37.6 (15 Dec 2024 20:45), Max: 37.6 (15 Dec 2024 20:45)  T(F): 99.6 (15 Dec 2024 20:45), Max: 99.6 (15 Dec 2024 20:45)  HR: 113 (15 Dec 2024 22:00) (76 - 113)  BP: 103/62 (15 Dec 2024 22:00) (96/52 - 123/83)  RR: 17 (15 Dec 2024 22:00) (17 - 19)  SpO2: 100% (15 Dec 2024 22:00) (99% - 100%)  FS: 109  UOP: 300    General: AOx3, pale and clammy  Respiratory: no increased work of breathing  Abdomen: soft, nontender, nondistended, no rebound or guarding, uterus fundus firm at midline, fundus 2FB below the umbilicus  Genitourinary: lochia WNL  Extremities: no swelling or calf tenderness        A/P  -Continue to closely monitor patient postpartum  -VS stable, normal lochia, low suspicion for PPH  -Dunlap catheter to remain in situ until tomorrow morning  -Patient instructed to eat, drink and rest      Britni David PA-C

## 2024-12-15 NOTE — OB PROVIDER LABOR PROGRESS NOTE - NS_OBIHIFHRDETAILS_OBGYN_ALL_OB_FT
baseline 125, mod jose, +Accels, -decels; Cat I tracing
baseline 120, mod jose, +accels, -decels; Cat I tracing

## 2024-12-15 NOTE — PROVIDER CONTACT NOTE (FALL NOTIFICATION) - ASSESSMENT
vitals before: 106/64, HR 90, RR 17, O2 98%  vitals after: 96/54, , RR 17, O2 99%  no injuries, complaint of mild ear ache  fundus and lochia within normal limits

## 2024-12-15 NOTE — OB RN DELIVERY SUMMARY - NS_GBSABXNAME_OBGYN_ALL_OB_FT
Pt comes to ED with c/o of fever, coughing and body aches that started last night.  Pt reports taking ibuprofen 45 minutes ago   ampicillin

## 2024-12-15 NOTE — OB PROVIDER LABOR PROGRESS NOTE - NS_SUBJECTIVE/OBJECTIVE_OBGYN_ALL_OB_FT
Day team assuming care. Patient examined by Dr. James, VE 3/80/-3.  EFM reviewed.  Baseline rate is 130 bpm, mod jose, + accels, - decels  Ctx q5 mins  Cat I tracing  Plan to start pitocin per Dr. James
EFM reviewed  Epidural in situ
EFM reviewed. Pit at 4 mu/min.  Baseline rate is 135 bpm, mod jose, + accels, - decels  Ctx q3 mins  Cat I tracing  Continue to monitor labor progression
EFM reviewed. S/p AROM at 1230 by Dr. James for clear fluid.  Baseline rate is 125 bpm, mod jose, - accels, + intermittent late decels  Cat II tracing; overall reassuring with moderate variability  Continue to reposition and resuscitate patient as needed  Will re-examine at 1630 or with increasing pressure
Patient in side-line release; EFM reviewed.  Baseline rate is 125 bpm, mod jose, + accels, + intermittent late decels  Ctx q2-3 mins  Cat II tracing; overall reassuring with moderate variability and + accels  Continue to reposition and resuscitate patient as needed
Patient re-examined for increasing rectal pressure; VE 4/80/-2. Pit at 4 mu/min.  EFM reviewed.  Baseline rate is 125 bpm, mod jose, + accels, - decels  Ctx q2-3 mins  Cat I tracing  Plan for AROM shortly
Patient re-examined; VE 4/80/-2, unchanged.  EFM reviewed.  Baseline rate is 130 bpm, mod jose, + accels, - decels  Ctx q2-3 mins  Cat I tracing  Plan to place [facility] shortly, per Dr. James
Per attending SVE: 2/70/-3  Epidural in situ  EFM reviewed

## 2024-12-15 NOTE — OB PROVIDER DELIVERY SUMMARY - NS_ROMTYPE_OBGYN_ALL_OB
Your Child's Health  8-9 Year-Old Visit      Ge WILLIAM Jordyn  December 11, 2016    Visit Vitals  /74   Ht 4' 1\" (1.245 m)   Wt 21 kg   BMI 13.56 kg/m²     Weight: 46.3 lbs      DEVELOPMENT:  At this age a typical child:  1.  Can read for enjoyment.  2.  Can tell a joke coherently.  3.  Is very concerned about rules and fairness.  4.  Is outspoken when he or she perceives unfairness in parents.  5.  Is able to take care of his or her room and assume responsibility with fewer reminders than at six years of age.  6.  Is learning to tell time (this sometimes takes until age ten).    SAFETY/ACCIDENT PREVENTION:  Accidents are the most common causes of death in  this age range.  Automobile fatalities are the most numerous, followed by deaths due to fire, drowning, falls, and gun accidents.  Continued reminders about seat belts, swimming rules, playing with fire, playing with guns, and other safety measures are necessary.  The seat belt should be across the hips and not across the stomach.  Ge should ride in the back seat.  The center seat is the safest if it has a shoulder harness.      INDIVIDUAL DIFFERENCES:  In this age range, differing abilities in physical coordination, memory, reading, and personality become apparent.  It is particularly important for parents to learn the individual strengths of each child and to praise and encourage them.  Encouragement can take the form of praise, gifts related to the child's interests, trips, memory games at the dinner table, and individual attention from you (yes, they still crave it at this age).    Academic achievement is easily recognized by both parent and school.  Be sure to support and praise the development of non-academic strengths such as artistic, mechanical, social, athletic, or verbal skills.  \"Annika is my finder; she always remembers where things are,\" is more likely to have a good effect than, \"She doesn't get good grades like her brother, but we  AROM like her anyway.\" Your hardest job may be telling the difference between low effort and genuine difficulty when Ge encounters poor grades.    SUN EXPOSURE:  There is now evidence that sun exposure, especially sunburn before the age of ten, increases the risk of skin cancer.  Fair-skinned people always have a higher risk.  Ge should avoid the midday sun, use sun block, and wear protective clothing and sunglasses.  Begin to educate him about tanning booths.  There is no such thing as \"safe\" tanning rays.  Set a good example; avoid burning and crash-tanning.  Follow the guidelines in our Sun Exposure handout.    DIET AND EXERCISE:  Some children may need breakfast to function well at school.  However, you should keep in mind that much of the research about the benefits of having breakfast was done by a breakfast cereal company.  In any case, the meal does not need to be big.    A multivitamin with 600 International Units vitamin D should be given to all children who drink less than 32 oz of milk per day.    Continue reminders about regular tooth brushing.    Television ads and convenience foods encourage a diet high in salt, fat, and calories as well as low in fiber.  Having a variety of alternative snack foods can promote better nutrition.  Pre-cut vegetables, dried fruits, fresh fruits, cheese, and unsalted nuts are examples of good snack foods.  Having these available will not be effective, however, if Ge knows that the cupboards are full of chips and cupcakes.    TELEVISION/VIDEO:  1.  Limit viewing to a maximum of two hours per day.    2.  Excessive TV is associated with obesity, aggressive behavior, and negative moods.  3.  Do not allow TV shows or videos that promote bad attitudes.  Many videos consistently portray women or minorities as victims.   4.  Teach Ge not to eat while watching TV by not doing it yourself.  5.  Encourage other forms of learning and entertainment, such as books,  story-telling, and trips to museums, farms, zoos, etc.    CHORES/ALLOWANCES:  Children at this age will handle chores with encouragement, but they can be very sensitive to fairness.  Chores should be evenly divided among siblings or rotated.  Excusing a child because of special activities, even if they are highly valued by the family (for example, music lessons in a musical family), will breed resentment in the siblings who end up picking up the chores.    An allowance can provide a useful tool for learning about Ge's values and personality as well as providing a lever for discipline.  Make certain that the amount is not so large that Ge does not have to make choices about what to spend it on.    MEDICATION FOR FEVER OR PAIN:   Acetaminophen liquid (e.g., Tylenol or Tempra) may be given every four hours as needed for pain or fever.  Acetaminophen liquid is less concentrated than the infant dropper bottle type. Be sure to check which product CONCENTRATION you are using.      CHILDREN’S Tylenol/Acetaminophen  (160 MG/5 mL)    Child’s Weight:  Dose:  36 - 47 pounds:    240 mg (7.5 mL (1 1/2 Teaspoons))  48 - 59 pounds:    320 mg (10.0 mL (2 Teaspoons))  60 - 71 pounds:    400 mg (12.5 mL (2 1/2 Teaspoons))  72 - 95 pounds:    480 mg (15.0 mL (3 Teaspoons))    CHILDREN’S Tylenol/Acetaminophen MELTAWAYS ( 80 MG tablets)    Child’s Weight:  Dose:  36 - 47 pounds:    240 mg (3 meltaway tablets)  48 - 59 pounds:    320 mg (4 meltaway tablets)  60 - 71 pounds:    400 mg (5 meltaway tablets)  72 - 95 pounds:    480 mg (6 meltaway tablets)    Sebastien (Jr) Tylenol/Acetaminophen MELTAWAYS (160 MG tablets)    Child’s Weight:  Dose:  36 - 47 pounds:    240 mg (1 1/2 meltaway tablets)  48 - 59 pounds:    320 mg (2 meltaway tablets)  60 - 71 pounds:    400 mg (2 1/2 meltaway tablets)  72 - 95 pounds:    480 mg (3 meltaway tablets)    CHILDREN'S Ibuprofen (e.g., Advil or Motrin) may be given every six hours as needed for  pain or fever.    Child’s Weight:  Dose:  48 - 59 pounds:    200 mg (2 Teaspoons of liquid)  60 - 71 pounds:    250 mg (2 1/2Teaspoons of liquid)  72 - 95 pounds:    300 mg (3 Teaspoons of liquid)    NEXT VISIT:  IN 1 - 2 YEARS       Thank you for entrusting your care to [facility].

## 2024-12-15 NOTE — OB PROVIDER H&P - HISTORY OF PRESENT ILLNESS
Patient is a 24 y.o.  @40w3d who presents with ctx for the last 3 days. States this morning the ctx were 6-7 minutes apart and she was checked in the office--VE at that time was 1.5cm. States ctx have gotten closer together over the course of the day, now 3-5 minutes apart States ctx are 7-8/10 in severity, and she wants epidural. Denies LOF, vaginal bleeding. Endorses fetal movement.    Spontaneous pregnancy, uncomplicated. DEBRA 24. NIPT and anatomy scan WNL. Failed GCT however vomited at time of GTT. States she checked FS for a few weeks however was told her values were mostly normal. Denies elevated BPs in the pregnancy. EFW 3200g by Leopolds.  GBS positive.   24: cephalic presentation, anterior placenta, JALEN 15.58, BPP 10/10, GDMA1, hx of placenta previa, resolved    Ob hx: G1- current   Gyn hx: Denies ovarian cysts, fibroids, abn pap, STIs/HSV  PMH: Denies  Meds: PNV  PSH: Denies  Allergies: Denies    Physical Exam  BP  123/83 (12-15-24 @ 01:13)  HR 76  Gen: Well-appearing. NAD.  Resp: Breathing comfortably on RA  Abd: Gravid uterus. Soft, non-tender, non-distended.    SSE:  VE: _    TAUS: _presentation. _placenta. BPP _/8, JALEN _.  TVUS:     FHT: Baseline _, moderate variability, +accels, -decels  [place]: Ctx_    A/P  24yyo G[ ]P[ ] @[ ]w[ ]d presenting [ ].     -     Aurora Alvarado, PGY1  Discussed with  Patient is a 24 y.o.  @40w3d who presents with ctx for the last 3 days. States this morning the ctx were 6-7 minutes apart and she was checked in the office--VE at that time was 1.5cm. States ctx have gotten closer together over the course of the day, now 3-5 minutes apart States ctx are 7-8/10 in severity, and she wants epidural. Denies LOF, vaginal bleeding. Endorses fetal movement. Denies headache, visual changes, chest pain, SOB, RUQ/epgiastric pain.     Spontaneous pregnancy, uncomplicated. DEBRA 24. NIPT and anatomy scan WNL. Failed GCT however vomited at time of GTT. States she checked FS for a few weeks however was told her values were mostly normal. Endorses 140 systolic blood pressure on two separate occasions during third trimester. EFW 3200g by Leopolds.  GBS positive.  BH 24: cephalic presentation, anterior placenta, JALEN 15.58, BPP 10/10, GDMA1, hx of placenta previa, resolved    Ob hx: G1- current   Gyn hx: Denies ovarian cysts, fibroids, abn pap, STIs/HSV  PMH: Denies  Meds: PNV  PSH: Denies  Allergies: Denies    Physical Exam  BP  123/83 (12-15-24 @ 01:13)  HR 76  Gen: Well-appearing. NAD.  Resp: Breathing comfortably on RA  Abd: Gravid uterus. Soft, non-tender, non-distended.    VE: 1.5/50/-3    TAUS: cephalic presentation.    FHT: Baseline 130, moderate variability, +accels, -decels; reactive and reassuring  [place]: Ctx q3-5 minutes    A/P  24 y.o.  @40w3d presenting in early labor, requesting epidural.  -Pt normotensive with no toxic complaints,   -Fetal status reassuring given tracing is reactive and reassuring  -Admit to L&D  -IV fluids  -Full labs sent  -Prenatals reviewed  -Will monitor FS q6 hours for possible GDMA1  -GBS positive, ampicillin ordered  -Risks/benefits/alternatives discussed; consent signed and in chart; all questions answered  -Continuous FHT and TOCO  -Plan for expectant management    Aurora Alvarado, PGY1  Discussed with Dr. James and Dr. Aguilera, PGY3

## 2024-12-15 NOTE — OB NEONATOLOGY/PEDIATRICIAN DELIVERY SUMMARY - BABY A: DATE/TIME OF DELIVERY
Administered B12, 1000 mcg,1cc and Depo Medrol (4)40mg/mL in Right Upper Gluteal    Administered Ketorolac 60mg, 2cc in Left Upper Gluteal    2 patient id's verified, allergies reviewed     15-Dec-2024

## 2024-12-15 NOTE — OB NEONATOLOGY/PEDIATRICIAN DELIVERY SUMMARY - NSPEDSNEONOTESA_OBGYN_ALL_OB_FT
Ped was called to L&D for FT born via  w/ NRFHT. Prior to birth, There was a nuchal cord around the head. OB clamp and cut and delivered the patient. Pt cried at birth. However, pt was having intermittent apneic episodes.  Pt was transferred under the radiant warmer. Where pt was suction, dry and stimulated. Patient was still having apneic episodes. Patient was giving NCPAP x 3min and weaned to room air. Monitor pt for an additional 10min on RA. Apneic episodes was resolved. Patient is clinically stable to go to well baby nursery.

## 2024-12-15 NOTE — OB RN DELIVERY SUMMARY - BABY A: APGAR 5 MIN COLOR, DELIVERY
1. Start with labs (can arrange for flu shot at that time).  I will contact you with results and any further recommendations.  2. Referral to start therapy with our behavioral health clinician (Cele).  She will contact you.  3. Referral for cortisone shots  4. Follow-up with PCP in 1-2 months.  If no improvement in mood at that time, discuss medication changes.   (1) body pink, extremities blue

## 2024-12-15 NOTE — OB PROVIDER DELIVERY SUMMARY - NSPROVIDERDELIVERYNOTE_OBGYN_ALL_OB_FT
25 yo  presented at 40w2d EGA in early labor on 24. Labor was augmented with pitocin. Patient received an epidural for pain control. Amniotomy was performed with clear fluid. She progressed to fully dilated, pushed effectively, and had a  from CHUCKY position with a tight nuchal cordx1 which was reduced and cut. Placenta was delivered spontaneously intact with 3 vessel cord. Cervix was inspected and tears were identified at 3 and 9 o'clock repaired with a 2-0 chromic suture. A second degree laceration and a mediolateral episiotomy were repaired with chromic suture without complications. Excellent hemostasis.  . Patient tolerated procedure well. Mother and infant in stable conditions. Dr. James present throughout procedure. 23 yo  presented at 40w2d EGA in early labor on 24. Labor was augmented with pitocin. Patient received an epidural for pain control. Amniotomy was performed with clear fluid. She progressed to fully dilated, pushed effectively, and had a  from CHUCKY position with a tight nuchal cordx1 which was clamped and cut. Placenta was delivered spontaneously intact with 3 vessel cord. Cervix was inspected and tears were identified at 3 and 9 o'clock, repaired with a 2-0 chromic suture. A mediolateral episiotomy were repaired with chromic suture without complications. Excellent hemostasis.  . Patient tolerated procedure well. Mother and infant in stable conditions.

## 2024-12-15 NOTE — OB PROVIDER DELIVERY SUMMARY - NS_AFTERADMROM_OBGYN_ALL_OB_DT
[Time Spent: ___ minutes] : I have spent [unfilled] minutes of time on the encounter which excludes teaching and separately reported services. 15-Dec-2024 12:32

## 2024-12-16 LAB — T PALLIDUM AB TITR SER: NEGATIVE — SIGNIFICANT CHANGE UP

## 2024-12-16 RX ORDER — IBUPROFEN 200 MG
600 TABLET ORAL EVERY 6 HOURS
Refills: 0 | Status: DISCONTINUED | OUTPATIENT
Start: 2024-12-16 | End: 2024-12-17

## 2024-12-16 RX ADMIN — ACETAMINOPHEN 500MG 975 MILLIGRAM(S): 500 TABLET, COATED ORAL at 09:11

## 2024-12-16 RX ADMIN — ACETAMINOPHEN 500MG 975 MILLIGRAM(S): 500 TABLET, COATED ORAL at 14:46

## 2024-12-16 RX ADMIN — Medication 1 SPRAY(S): at 05:52

## 2024-12-16 RX ADMIN — Medication 600 MILLIGRAM(S): at 05:52

## 2024-12-16 RX ADMIN — SODIUM CHLORIDE 3 MILLILITER(S): 9 INJECTION, SOLUTION INTRAMUSCULAR; INTRAVENOUS; SUBCUTANEOUS at 14:40

## 2024-12-16 RX ADMIN — Medication 600 MILLIGRAM(S): at 11:58

## 2024-12-16 RX ADMIN — Medication 1 APPLICATION(S): at 05:52

## 2024-12-16 RX ADMIN — Medication 600 MILLIGRAM(S): at 18:02

## 2024-12-16 RX ADMIN — .BETA.-CAROTENE, SODIUM ACETATE, ASCORBIC ACID, CHOLECALCIFEROL, .ALPHA.-TOCOPHEROL ACETATE, DL-, THIAMINE MONONITRATE, RIBOFLAVIN, NIACINAMIDE, PYRIDOXINE HYDROCHLORIDE, FOLIC ACID, CYANOCOBALAMIN, CALCIUM CARBONATE, FERROUS FUMARATE, ZINC OXIDE AND CUPRIC OXIDE 1 TABLET(S): 2000; 2000; 120; 400; 22; 1.84; 3; 20; 10; 1; 12; 200; 27; 25; 2 TABLET ORAL at 11:58

## 2024-12-16 NOTE — PROGRESS NOTE ADULT - ASSESSMENT
A/P 24y s/p , PPD#1, stable, meeting postpartum milestones   - Pain: well controlled on tylenol/motrin  - GI: Tolerating regular diet  - : anm catheter in place, to be removed this AM  - DVT prophylaxis: ambulating frequently  - Dispo: PPD 2, unless otherwise specified

## 2024-12-16 NOTE — LACTATION INITIAL EVALUATION - LACTATION INTERVENTIONS
initiate/review safe skin-to-skin/initiate/review hand expression/initiate/review techniques for position and latch/post discharge community resources provided/review techniques to increase milk supply/review techniques to manage sore nipples/engorgement/reviewed components of an effective feeding and at least 8 effective feedings per day required/reviewed importance of monitoring infant diapers, the breastfeeding log, and minimum output each day/reviewed risks of artificial nipples/reviewed benefits and recommendations for rooming in/reviewed feeding on demand/by cue at least 8 times a day/reviewed indications of inadequate milk transfer that would require supplementation

## 2024-12-16 NOTE — LACTATION INITIAL EVALUATION - NS LACT CON REASON FOR REQ
40.3 wk gestation baby, about 13.5 hrs old at this time. Placed the baby STS with the mother while I provided breastfeeding education and explained normal  behaviour and the milk production feedback system. Assisted with positioning in a football  hold and taught latch strategies. Baby was able to latch deeply and is feeding well, rhythmically sucking between short pauses of rest. Mother to continue with STS when possible, room-in, and feed as per cues at least 8-12x/ day. To f/u as needed./inverted/flat nipples/primaparous mom/staff request

## 2024-12-16 NOTE — CHART NOTE - NSCHARTNOTEFT_GEN_A_CORE
Patient reevaluated before moving to postpartum. Patient previously reporting dizziness which is now improved after having dinner and taking a nap. Patient's primary complaint at this moment is feeling sleepy. She denies SOB, chest pain, chest pressure, palpitations. BPs previously with systolics in the 80s, most recently in the high 90s-100.    Physical Exam:  T(C): 37.6 (12-15-24 @ 20:45), Max: 37.6 (12-15-24 @ 20:45)  HR: 96 (12-16-24 @ 00:45) (95 - 115)  BP: 105/62 (12-16-24 @ 00:45) (85/60 - 110/62)  RR: 17 (12-16-24 @ 00:45) (17 - 17)  SpO2: 100% (12-16-24 @ 00:45) (99% - 100%)    GA: NAD, A&O x 3  Pulm: no increased work of breathing  Abd: soft, nontender, nondistended, no rebound or guarding, uterus firm and at midline 2fb below the umbilicus.  : Minimal blood noted with uterine massage. No discoloration or hardening noted along the vaginal walls.    A/P  - Dizziness improved with rest and eating  - VS stable, uterus firm with low suspicion for PPH  - No hematomas noted on exam  - Continue to monitor closely    Discussed with Dr. Tiki Herbert PGY3  Shweta Lira PGY1

## 2024-12-17 ENCOUNTER — TRANSCRIPTION ENCOUNTER (OUTPATIENT)
Age: 24
End: 2024-12-17

## 2024-12-17 ENCOUNTER — APPOINTMENT (OUTPATIENT)
Dept: ANTEPARTUM | Facility: CLINIC | Age: 24
End: 2024-12-17

## 2024-12-17 VITALS
OXYGEN SATURATION: 98 % | RESPIRATION RATE: 18 BRPM | HEART RATE: 88 BPM | SYSTOLIC BLOOD PRESSURE: 104 MMHG | DIASTOLIC BLOOD PRESSURE: 69 MMHG | TEMPERATURE: 98 F

## 2024-12-17 PROCEDURE — 86850 RBC ANTIBODY SCREEN: CPT

## 2024-12-17 PROCEDURE — 85384 FIBRINOGEN ACTIVITY: CPT

## 2024-12-17 PROCEDURE — 82962 GLUCOSE BLOOD TEST: CPT

## 2024-12-17 PROCEDURE — 82570 ASSAY OF URINE CREATININE: CPT

## 2024-12-17 PROCEDURE — 85025 COMPLETE CBC W/AUTO DIFF WBC: CPT

## 2024-12-17 PROCEDURE — 59050 FETAL MONITOR W/REPORT: CPT

## 2024-12-17 PROCEDURE — 86780 TREPONEMA PALLIDUM: CPT

## 2024-12-17 PROCEDURE — 86900 BLOOD TYPING SEROLOGIC ABO: CPT

## 2024-12-17 PROCEDURE — 83615 LACTATE (LD) (LDH) ENZYME: CPT

## 2024-12-17 PROCEDURE — 36415 COLL VENOUS BLD VENIPUNCTURE: CPT

## 2024-12-17 PROCEDURE — 86901 BLOOD TYPING SEROLOGIC RH(D): CPT

## 2024-12-17 PROCEDURE — 80053 COMPREHEN METABOLIC PANEL: CPT

## 2024-12-17 PROCEDURE — 84156 ASSAY OF PROTEIN URINE: CPT

## 2024-12-17 PROCEDURE — 84550 ASSAY OF BLOOD/URIC ACID: CPT

## 2024-12-17 RX ADMIN — WITCH HAZEL 1 APPLICATION(S): 50 SOLUTION RECTAL at 09:38

## 2024-12-17 RX ADMIN — ACETAMINOPHEN 500MG 975 MILLIGRAM(S): 500 TABLET, COATED ORAL at 09:37

## 2024-12-17 RX ADMIN — ACETAMINOPHEN 500MG 975 MILLIGRAM(S): 500 TABLET, COATED ORAL at 15:15

## 2024-12-17 RX ADMIN — Medication 30 MILLILITER(S): at 09:37

## 2024-12-17 RX ADMIN — .BETA.-CAROTENE, SODIUM ACETATE, ASCORBIC ACID, CHOLECALCIFEROL, .ALPHA.-TOCOPHEROL ACETATE, DL-, THIAMINE MONONITRATE, RIBOFLAVIN, NIACINAMIDE, PYRIDOXINE HYDROCHLORIDE, FOLIC ACID, CYANOCOBALAMIN, CALCIUM CARBONATE, FERROUS FUMARATE, ZINC OXIDE AND CUPRIC OXIDE 1 TABLET(S): 2000; 2000; 120; 400; 22; 1.84; 3; 20; 10; 1; 12; 200; 27; 25; 2 TABLET ORAL at 12:31

## 2024-12-17 RX ADMIN — Medication 600 MILLIGRAM(S): at 12:30

## 2024-12-17 NOTE — DISCHARGE NOTE OB - PATIENT PORTAL LINK FT
You can access the FollowMyHealth Patient Portal offered by [facility] by registering at the following website: [email or web address]. By joining [employer]’s FollowMyHealth portal, you will also be able to view your health information using other applications (apps) compatible with our system.

## 2024-12-17 NOTE — DISCHARGE NOTE OB - PLAN OF CARE
# Monitor blood pressure three times a day. Please document the blood pressure values to review with obstetrician at follow-up appointment.   # If your blood pressure is equal to or greater than 160/110 (either one) OR if you experience any of the following symptoms: changes in vision, headache not relieved with Tylenol, severe abdominal pain, vomiting, increased vaginal bleeding, chest pain or shortness of breath, please return to the hospital.  # If your blood pressure is equal to or greater than 150/100 (either one), please call your obstetrician.  # Please schedule an appointment at your physician office in one week. Take Motrin 600mg every 6 hours and/or tylenol 650mg every 6 hours as needed for pain. Call your OB to schedule a follow up appointment in 6 weeks. Nothing per vagina until cleared by your OB - no intercourse, douching, tampons, etc.  Call your OB if you experience severe abdominal pain not improved by oral pain medications, heavy bright red vaginal bleeding saturating more than 1 pad per hour, or fever greater than 100.4F. Consider contraception options to be discussed with your OB.

## 2024-12-17 NOTE — DISCHARGE NOTE OB - FINANCIAL ASSISTANCE
[facility] provides services at a reduced cost to those who are determined to be eligible through [facility]’s financial assistance program. Information regarding [facility]’s financial assistance program can be found by going to [email or web address] or by calling [phone].

## 2024-12-17 NOTE — PROGRESS NOTE ADULT - SUBJECTIVE AND OBJECTIVE BOX
Patient evaluated at bedside this morning, resting comfortable in bed, no acute events overnight.  She reports pain is well controlled with tylenol and motrin.  She denies headache, dizziness, chest pain, palpitations, shortness of breath, nausea, vomiting, fever, chills, heavy vaginal bleeding. She has yet to ambulate without assistance, nam catheter in place.  Tolerating food well, without nausea/vomit.      Physical Exam:  T(C): 36.6 (12-16-24 @ 06:16), Max: 37.6 (12-15-24 @ 20:45)  HR: 81 (12-16-24 @ 06:16) (81 - 115)  BP: 97/65 (12-16-24 @ 06:16) (84/53 - 110/62)  RR: 16 (12-16-24 @ 06:16) (16 - 17)  SpO2: 98% (12-16-24 @ 06:16) (98% - 100%)    GA: NAD, A&O x 3  Pulm: no increased work of breathing  Abd: soft, nontender, nondistended, no rebound or guarding, uterus firm.  Extremities: no calf tenderness                          12.7   8.93  )-----------( 190      ( 15 Dec 2024 00:25 )             39.8     12-15    133[L]  |  99  |  6[L]  ----------------------------<  95  3.9   |  21[L]  |  0.53    Ca    9.3      15 Dec 2024 00:25    TPro  7.5  /  Alb  4.1  /  TBili  0.3  /  DBili  x   /  AST  20  /  ALT  8[L]  /  AlkPhos  116  12-15    acetaminophen     Tablet .. 975 milliGRAM(s) Oral <User Schedule>  benzocaine 20%/menthol 0.5% Spray 1 Spray(s) Topical every 6 hours PRN  dexAMETHasone  Injectable 4 milliGRAM(s) IV Push every 6 hours PRN  dibucaine 1% Ointment 1 Application(s) Topical every 6 hours PRN  diphenhydrAMINE 25 milliGRAM(s) Oral every 6 hours PRN  diphtheria/tetanus/pertussis (acellular) Vaccine (Adacel) 0.5 milliLiter(s) IntraMuscular once  fentanyl (2 MICROgram(s)/mL) + bupivacaine 0.0625%  in 0.9% Sodium Chloride PCEA 250 milliLiter(s) Epidural PCA Continuous  hydrocortisone 1% Cream 1 Application(s) Topical every 6 hours PRN  ibuprofen  Tablet. 600 milliGRAM(s) Oral every 6 hours  influenza   Vaccine 0.5 milliLiter(s) IntraMuscular once  lanolin Ointment 1 Application(s) Topical every 6 hours PRN  magnesium hydroxide Suspension 30 milliLiter(s) Oral two times a day PRN  naloxone Injectable 0.1 milliGRAM(s) IV Push every 3 minutes PRN  ondansetron Injectable 4 milliGRAM(s) IV Push every 6 hours PRN  oxyCODONE    IR 5 milliGRAM(s) Oral every 3 hours PRN  oxyCODONE    IR 5 milliGRAM(s) Oral once PRN  oxytocin Infusion 167 milliUNIT(s)/Min IV Continuous <Continuous>  oxytocin Infusion. 2 milliUNIT(s)/Min IV Continuous <Continuous>  pramoxine 1%/zinc 5% Cream 1 Application(s) Topical every 4 hours PRN  prenatal multivitamin 1 Tablet(s) Oral daily  simethicone 80 milliGRAM(s) Chew every 4 hours PRN  sodium chloride 0.9% lock flush 3 milliLiter(s) IV Push every 8 hours  witch hazel Pads 1 Application(s) Topical every 4 hours PRN  
Patient evaluated at bedside this morning, resting comfortable in bed, no acute events overnight. Vital signs stable overnight.  She reports pain is well controlled with tylenol and motrin.  She denies  chest pain, shortness of breath, nausea, vomiting, heavy vaginal bleeding. She has been ambulating without assistance, voiding spontaneously.  Tolerating food well, without nausea/vomit.    Physical Exam:  T(C): 36.6 (24 @ 06:09), Max: 36.6 (24 @ 06:09)  HR: 91 (24 @ 06:09) (90 - 91)  BP: 104/69 (24 @ 06:09) (98/63 - 104/69)  RR: 17 (24 @ 06:09) (17 - 18)  SpO2: 99% (24 @ 06:09) (99% - 99%)    GA: NAD, A&O x 3  Pulm: no increased work of breathing  Abd: soft, nontender, nondistended, no rebound or guarding, uterus firm.  Extremities: no swelling or calf tenderness     acetaminophen     Tablet .. 975 milliGRAM(s) Oral <User Schedule>  benzocaine 20%/menthol 0.5% Spray 1 Spray(s) Topical every 6 hours PRN  dexAMETHasone  Injectable 4 milliGRAM(s) IV Push every 6 hours PRN  dibucaine 1% Ointment 1 Application(s) Topical every 6 hours PRN  diphenhydrAMINE 25 milliGRAM(s) Oral every 6 hours PRN  diphtheria/tetanus/pertussis (acellular) Vaccine (Adacel) 0.5 milliLiter(s) IntraMuscular once  hydrocortisone 1% Cream 1 Application(s) Topical every 6 hours PRN  ibuprofen  Tablet. 600 milliGRAM(s) Oral every 6 hours  influenza   Vaccine 0.5 milliLiter(s) IntraMuscular once  lanolin Ointment 1 Application(s) Topical every 6 hours PRN  magnesium hydroxide Suspension 30 milliLiter(s) Oral two times a day PRN  naloxone Injectable 0.1 milliGRAM(s) IV Push every 3 minutes PRN  ondansetron Injectable 4 milliGRAM(s) IV Push every 6 hours PRN  oxyCODONE    IR 5 milliGRAM(s) Oral every 3 hours PRN  oxyCODONE    IR 5 milliGRAM(s) Oral once PRN  oxytocin Infusion 167 milliUNIT(s)/Min IV Continuous <Continuous>  oxytocin Infusion. 2 milliUNIT(s)/Min IV Continuous <Continuous>  pramoxine 1%/zinc 5% Cream 1 Application(s) Topical every 4 hours PRN  prenatal multivitamin 1 Tablet(s) Oral daily  simethicone 80 milliGRAM(s) Chew every 4 hours PRN  sodium chloride 0.9% lock flush 3 milliLiter(s) IV Push every 8 hours  witch hazel Pads 1 Application(s) Topical every 4 hours PRN    ==  A/P   Pt s/p , PPD#2 , stable, meeting postpartum milestones   #gHTN   - VSS  - VSQ4  #PPC  - Pain: well controlled on tylenol/motrin  - GI: Tolerating regular diet  - : urinating without difficulty/pain  - DVT prophylaxis: ambulating frequently  - Dispo: PPD 2, unless otherwise specified    Melani Ibrahim PGY1

## 2024-12-17 NOTE — DISCHARGE NOTE OB - CARE PROVIDER_API CALL
Caty James  Obstetrics and Gynecology  [address] [place] [id number]  Phone: [phone]  Fax: [phone]  Follow Up Time:

## 2024-12-17 NOTE — DISCHARGE NOTE OB - HOSPITAL COURSE
Patient admitted to L&D for vaginal delivery.  Intrapartum course c/b gHTN.  Postpartum course uncomplicated. Pt did not require antihypertensive medication and has no toxic complaints throughout  her hospitalization. Discharged home on PP Day #2.

## 2024-12-17 NOTE — DISCHARGE NOTE OB - CARE PLAN
1 Principal Discharge DX:	Postpartum state  Assessment and plan of treatment:	Take Motrin 600mg every 6 hours and/or tylenol 650mg every 6 hours as needed for pain. Call your OB to schedule a follow up appointment in 6 weeks. Nothing per vagina until cleared by your OB - no intercourse, douching, tampons, etc.  Call your OB if you experience severe abdominal pain not improved by oral pain medications, heavy bright red vaginal bleeding saturating more than 1 pad per hour, or fever greater than 100.4F. Consider contraception options to be discussed with your OB.  Secondary Diagnosis:	Gestational hypertension  Assessment and plan of treatment:	# Monitor blood pressure three times a day. Please document the blood pressure values to review with obstetrician at follow-up appointment.   # If your blood pressure is equal to or greater than 160/110 (either one) OR if you experience any of the following symptoms: changes in vision, headache not relieved with Tylenol, severe abdominal pain, vomiting, increased vaginal bleeding, chest pain or shortness of breath, please return to the hospital.  # If your blood pressure is equal to or greater than 150/100 (either one), please call your obstetrician.  # Please schedule an appointment at your physician office in one week.

## 2024-12-18 ENCOUNTER — TRANSCRIPTION ENCOUNTER (OUTPATIENT)
Age: 24
End: 2024-12-18

## 2024-12-22 DIAGNOSIS — Z28.21 IMMUNIZATION NOT CARRIED OUT BECAUSE OF PATIENT REFUSAL: ICD-10-CM

## 2024-12-22 DIAGNOSIS — O26.893 OTHER SPECIFIED PREGNANCY RELATED CONDITIONS, THIRD TRIMESTER: ICD-10-CM

## 2024-12-22 DIAGNOSIS — Z3A.40 40 WEEKS GESTATION OF PREGNANCY: ICD-10-CM

## 2024-12-22 DIAGNOSIS — Z28.09 IMMUNIZATION NOT CARRIED OUT BECAUSE OF OTHER CONTRAINDICATION: ICD-10-CM

## 2024-12-22 DIAGNOSIS — R55 SYNCOPE AND COLLAPSE: ICD-10-CM
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
